# Patient Record
Sex: MALE | Race: WHITE
[De-identification: names, ages, dates, MRNs, and addresses within clinical notes are randomized per-mention and may not be internally consistent; named-entity substitution may affect disease eponyms.]

---

## 2021-10-13 ENCOUNTER — HOSPITAL ENCOUNTER (EMERGENCY)
Dept: HOSPITAL 11 - JP.ED | Age: 46
Discharge: HOME | End: 2021-10-13
Payer: MEDICARE

## 2021-10-13 DIAGNOSIS — Z88.0: ICD-10-CM

## 2021-10-13 DIAGNOSIS — G90.9: ICD-10-CM

## 2021-10-13 DIAGNOSIS — G44.1: Primary | ICD-10-CM

## 2021-10-13 PROCEDURE — 99285 EMERGENCY DEPT VISIT HI MDM: CPT

## 2021-10-13 PROCEDURE — 70450 CT HEAD/BRAIN W/O DYE: CPT

## 2021-10-13 PROCEDURE — 96372 THER/PROPH/DIAG INJ SC/IM: CPT

## 2021-10-13 PROCEDURE — 73630 X-RAY EXAM OF FOOT: CPT

## 2021-10-13 NOTE — CRLCT
For Patients:  As a result of the 21st Century Cures Act, medical imaging 

exams and procedure reports are released immediately into your electronic 

medical record.  You may view this report before your referring provider.  

If you have questions, please contact your health care provider.



INDICATION:



Severe headaches.



COMPARISON:



None.



TECHNIQUE:



Noncontrast CT head.



FINDINGS:



Normal brain parenchymal morphology. No acute intracranial hemorrhage, 

acute infarct, focal edema, mass effect, or fracture. No midline shift. No 

abnormal ventricular dilatation. Normal calvarium and skull base. 

Visualized paranasal sinuses and mastoid air cells are clear.



Normal orbits bilaterally.



IMPRESSION:



1. No acute intracranial abnormality.



2. Normal brain parenchymal morphology



Please note that all CT scans at this facility use dose modulation, 

iterative reconstruction, and/or weight-based dosing when appropriate to 

reduce radiation dose to as low as reasonably achievable.



Dictated by Fernie Greenberg MD @ 10/13/2021 4:19:09 PM



(Electronically Signed)

## 2021-10-13 NOTE — EDM.PDOC
ED HPI GENERAL MEDICAL PROBLEM





- General


Chief Complaint: Headache


Stated Complaint: MEDICAL VIA [facility]


Time Seen by Provider: 10/13/21 15:20


Source of Information: Reports: Patient, EMS, Family


History Limitations: Reports: No Limitations





- History of Present Illness


INITIAL COMMENTS - FREE TEXT/NARRATIVE: 





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 

headache yesterday and it has been persistent for the past 24 hours.  It is 

behind his eyes, throbbing, bilateral and somewhat worse on the left.  Fairly 

sudden onset yesterday.  Nausea and intermittent vomiting.  No fevers or chills,

no recent trauma.


Onset: Sudden (Started fairly suddenly after getting up yesterday morning)


Duration: Hour(s): (About 24 hours)


Location: Reports: Head


  ** Headache


Pain Score (Numeric/FACES): 7





- Related Data


                                    Allergies











Allergy/AdvReac Type Severity Reaction Status Date / Time


 


Penicillins Allergy  Cannot Verified 10/13/21 15:22





   Remember  











Home Meds: 


                                    Home Meds





Desvenlafaxine Succinate [Desvenlafaxine Succinate ER] 50 mg PO DAILY 10/13/21 

[History]


Imipramine HCl [Imipramine] 25 mg PO DAILY 10/13/21 [History]


Sulfameth/Trimethoprim 1 tab PO DAILY 10/13/21 [History]











ED ROS GENERAL





- Review of Systems


Review Of Systems: See Below


Constitutional: Reports: Malaise.  Denies: Fever, Chills


HEENT: Denies: Vision Change


Respiratory: Denies: Shortness of Breath


Cardiovascular: Denies: Chest Pain


GI/Abdominal: Reports: Nausea


: Reports: Other (Chronic indwelling catheter due to paraplegia)


Musculoskeletal: Reports: Other (Injury to right foot 2-1/2 years ago has never 

been checked)


Skin: Reports: No Symptoms


Neurological: Reports: Headache, Other (According to the wife the patient has 

had a lot of problems with autonomic instability over the past 6 to 7 months 

since removal of a gangrenous gallbladder.  Recheck labs and UAs have been 

negative)





- Physical Exam


Exam: See Below


Exam Limited By: No Limitations


General Appearance: Alert, Mild Distress, Other (Looks fairly uncomfortable, as

andi for pain control before history could be obtained)


Eye Exam: Bilateral Eye: EOMI, PERRL


Head Exam: Atraumatic


Respiratory/Chest: No Respiratory Distress


Cardiovascular: Regular Rate, Rhythm


GI/Abdominal: Soft, Other (No feeling in the patient's abdomen, no firmness or m

asses felt)


Neuro Exam (Abbreviated): Alert, Oriented, Slow to Respond, Other (No motor or 

sensation below the chest)


Extremities: Other (Lower extremities are atrophic, both feet have a bluish 

color due to vascular insufficiency, no skin breakdown.)


Psychiatric: Flat Affect





Course





- Vital Signs


Last Recorded V/S: 


                                Last Vital Signs











Temp  95.7 F L  10/13/21 15:54


 


Pulse  91   10/13/21 17:22


 


Resp  18   10/13/21 15:54


 


BP  99/66   10/13/21 17:22


 


Pulse Ox  95   10/13/21 17:04














- Orders/Labs/Meds


Orders: 


                               Active Orders 24 hr











 Category Date Time Status


 


 Foot Comp Min 3V Rt [CR] Stat Exams  10/13/21 16:37 Taken











Meds: 


Medications














Discontinued Medications














Generic Name Dose Route Start Last Admin





  Trade Name Rolandq  PRN Reason Stop Dose Admin


 


Hydromorphone HCl  1 mg  10/13/21 15:20  10/13/21 15:29





  Hydromorphone 1 Mg/Ml Syringe  IM  10/13/21 15:21  1 mg





  ONETIME ONE   Administration


 


Sodium Chloride  1,000 mls @ 1,000 mls/hr  10/13/21 16:15  10/13/21 16:21





  Normal Saline  IV   1,000 mls/hr





  ASDIRECTED JOHAN   Administration


 


Ondansetron HCl  4 mg  10/13/21 15:20  10/13/21 15:29





  Ondansetron 4 Mg Tab.Dis  PO  10/13/21 15:21  4 mg





  ONETIME ONE   Administration


 


Sumatriptan Succinate  6 mg  10/13/21 15:50  10/13/21 15:58





  Sumatriptan 6 Mg/0.5 Ml Sdv  SUBCUT  10/13/21 15:51  6 mg





  ONETIME ONE   Administration














- Re-Assessments/Exams


Free Text/Narrative Re-Assessment/Exam: 





10/13/21 15:51


Patient was given 1 mg of Dilaudid IM, along with 4 mg of sublingual Zofran.  CT

 scan was done which showed no acute findings.  His family then arrived and 

admitted that he does have treatment for migraines which come on fairly suddenly

 quite often.  He was still having some headache but the nausea was gone.  He 

was then given 6 mg of subcu Imitrex.


10/13/21 17:01


Patient's headache basically resolved but he did get fairly hypotensive at one 

point so was given 1 L of fluid.  His wife wanted further work-up to evaluate 

his autonomic neurologic instability over the past couple of years, especially 

the last 6 months but he is already had numerous labs and monitoring and other 

than a right foot x-ray I really had nothing to offer from an emergency 

standpoint.


10/13/21 17:26


Atrophy of the distal aspect of the right fifth metatarsal and diffuse 

osteoporosis of the findings of the foot x-ray, nothing acute.  Patient remained

 stable after the fluids and had significant improvement of his headache.  He 

will be discharged and encouraged to continue his regular medications, and see 

if he can get a consult to neurology through his primary provider to discuss the

 family's ongoing concern about his autonomic nerve instability.





Departure





- Departure


Time of Disposition: 17:42


Disposition: Home, Self-Care 01


Clinical Impression: 


 Vascular headache, Autonomic instability








- Discharge Information


Instructions:  Migraine Headache


Referrals: 


PCP,Unknown [Primary Care Provider] - 


Forms:  ED Department Discharge


Care Plan Goals: 


Stay hydrated, use your prescription medications as needed, and discuss a 

neurology consult with your primary provider to address the persistent autonomic

instability you are concerned about.  Return to the emergency room at any time 

for any acute concerns.





- My Orders


Last 24 Hours: 


My Active Orders





10/13/21 16:37


Foot Comp Min 3V Rt [CR] Stat 














- Assessment/Plan


Last 24 Hours: 


My Active Orders





10/13/21 16:37


Foot Comp Min 3V Rt [CR] Stat

## 2021-10-14 NOTE — CR
Foot Comp Min 3V Rt

 

CLINICAL HISTORY:  Injury

 

FINDINGS: The bones are moderately osteoporotic. There is deformity and foot

angulation. Patient is paraplegic. There is deformity of the tibiotalar joint as

well as some carpal junction. No fracture is seen.

 

IMPRESSION: There are chronic articular and bony changes in the foot. Patient is

paraplegic

 

Deformity of the distal fifth metatarsal appears chronic

 

No acute fracture or dislocation